# Patient Record
Sex: FEMALE | NOT HISPANIC OR LATINO | URBAN - METROPOLITAN AREA
[De-identification: names, ages, dates, MRNs, and addresses within clinical notes are randomized per-mention and may not be internally consistent; named-entity substitution may affect disease eponyms.]

---

## 2020-11-20 ENCOUNTER — APPOINTMENT (OUTPATIENT)
Age: 65
Setting detail: DERMATOLOGY
End: 2020-11-23

## 2020-11-20 DIAGNOSIS — L24 IRRITANT CONTACT DERMATITIS: ICD-10-CM

## 2020-11-20 DIAGNOSIS — L81.0 POSTINFLAMMATORY HYPERPIGMENTATION: ICD-10-CM

## 2020-11-20 PROBLEM — L24.9 IRRITANT CONTACT DERMATITIS, UNSPECIFIED CAUSE: Status: ACTIVE | Noted: 2020-11-20

## 2020-11-20 PROCEDURE — OTHER COUNSELING: OTHER

## 2020-11-20 PROCEDURE — OTHER TREATMENT REGIMEN: OTHER

## 2020-11-20 PROCEDURE — 99202 OFFICE O/P NEW SF 15 MIN: CPT

## 2020-11-20 PROCEDURE — OTHER MIPS QUALITY: OTHER

## 2020-11-20 PROCEDURE — OTHER PRESCRIPTION: OTHER

## 2020-11-20 RX ORDER — BETAMETHASONE VALERATE 1 MG/G
OINTMENT TOPICAL
Qty: 1 | Refills: 1 | Status: ERX | COMMUNITY
Start: 2020-11-20

## 2020-11-20 ASSESSMENT — LOCATION DETAILED DESCRIPTION DERM
LOCATION DETAILED: RIGHT MEDIAL TRAPEZIAL NECK
LOCATION DETAILED: LEFT INFERIOR ANTERIOR NECK
LOCATION DETAILED: RIGHT INFERIOR CENTRAL MALAR CHEEK
LOCATION DETAILED: RIGHT CENTRAL MALAR CHEEK
LOCATION DETAILED: RIGHT INFERIOR ANTERIOR NECK

## 2020-11-20 ASSESSMENT — LOCATION SIMPLE DESCRIPTION DERM
LOCATION SIMPLE: RIGHT CHEEK
LOCATION SIMPLE: POSTERIOR NECK
LOCATION SIMPLE: RIGHT ANTERIOR NECK
LOCATION SIMPLE: LEFT ANTERIOR NECK

## 2020-11-20 ASSESSMENT — LOCATION ZONE DERM
LOCATION ZONE: NECK
LOCATION ZONE: FACE

## 2020-11-20 ASSESSMENT — SEVERITY ASSESSMENT 2020: SEVERITY 2020: MILD

## 2020-11-20 NOTE — HPI: RASH
How Severe Is Your Rash?: moderate
Is This A New Presentation, Or A Follow-Up?: Rash
Additional History: Patient states the rash started about 10 days ago. It started as a small sore around the mouth that spread to her cheek and neck area. She was given mupirocin ointment as well as an antibiotic shot by the urgent care. They took a culture (on file) and told her to stop all other medication and start a medrol dose pack (prednisone).

## 2020-11-20 NOTE — PROCEDURE: TREATMENT REGIMEN
Otc Regimen: Zyrtec or Allegra during the day \\nBenadryl at night
Samples Given: VaniCream facial cleanser and moisturizer
Detail Level: Simple
Plan: betamethasone valerate .1 cream bid for 5 days prn.
Discontinue Regimen: All harsh products and washing face with a wash cloth (use fingertips)

## 2020-12-04 ENCOUNTER — APPOINTMENT (OUTPATIENT)
Age: 65
Setting detail: DERMATOLOGY
End: 2020-12-08

## 2020-12-04 DIAGNOSIS — L29.8 OTHER PRURITUS: ICD-10-CM

## 2020-12-04 DIAGNOSIS — L24 IRRITANT CONTACT DERMATITIS: ICD-10-CM

## 2020-12-04 DIAGNOSIS — L81.0 POSTINFLAMMATORY HYPERPIGMENTATION: ICD-10-CM

## 2020-12-04 PROBLEM — L24.9 IRRITANT CONTACT DERMATITIS, UNSPECIFIED CAUSE: Status: ACTIVE | Noted: 2020-12-04

## 2020-12-04 PROCEDURE — OTHER PRESCRIPTION: OTHER

## 2020-12-04 PROCEDURE — OTHER TREATMENT REGIMEN: OTHER

## 2020-12-04 PROCEDURE — 99213 OFFICE O/P EST LOW 20 MIN: CPT

## 2020-12-04 PROCEDURE — OTHER COUNSELING: OTHER

## 2020-12-04 RX ORDER — PREDNISONE 20 MG/1
TABLET ORAL
Qty: 10 | Refills: 0 | Status: ERX | COMMUNITY
Start: 2020-12-04

## 2020-12-04 RX ORDER — BETAMETHASONE DIPROPIONATE 0.5 MG/G
CREAM TOPICAL
Qty: 1 | Refills: 1 | Status: ERX | COMMUNITY
Start: 2020-12-04

## 2020-12-04 ASSESSMENT — LOCATION DETAILED DESCRIPTION DERM
LOCATION DETAILED: RIGHT CENTRAL MALAR CHEEK
LOCATION DETAILED: RIGHT INFERIOR CENTRAL MALAR CHEEK
LOCATION DETAILED: RIGHT INFERIOR ANTERIOR NECK
LOCATION DETAILED: RIGHT CENTRAL BUCCAL CHEEK
LOCATION DETAILED: LEFT INFERIOR ANTERIOR NECK
LOCATION DETAILED: RIGHT MEDIAL TRAPEZIAL NECK

## 2020-12-04 ASSESSMENT — LOCATION SIMPLE DESCRIPTION DERM
LOCATION SIMPLE: RIGHT ANTERIOR NECK
LOCATION SIMPLE: LEFT ANTERIOR NECK
LOCATION SIMPLE: RIGHT CHEEK
LOCATION SIMPLE: POSTERIOR NECK

## 2020-12-04 ASSESSMENT — LOCATION ZONE DERM
LOCATION ZONE: NECK
LOCATION ZONE: FACE

## 2020-12-04 ASSESSMENT — SEVERITY ASSESSMENT 2020: SEVERITY 2020: MODERATE

## 2020-12-04 NOTE — PROCEDURE: TREATMENT REGIMEN
Plan: Patient given contact information to [facility] to discuss fu evaluation and treatment (possible patch testing). Plan: Patient given contact information to [facility] to discuss fu evaluation and treatment (possible patch testing).

## 2020-12-04 NOTE — PROCEDURE: TREATMENT REGIMEN
Initiate Treatment: prednisone 20 mg tablet \\nSig: Take 1 pill PO twice daily x 5 days.\\n\\nbetamethasone dipropionate 0.05 % topical cream \\nSig: Apply to affected area (neck) twice a day for two weeks.

## 2020-12-04 NOTE — PROCEDURE: TREATMENT REGIMEN
Continue Regimen: Antihistamines on a scheduled daily basis for itching (Zyrtec Qam and Benadryl QHS)

## 2020-12-04 NOTE — PROCEDURE: TREATMENT REGIMEN
Discontinue Regimen: All harsh products and washing face with a wash cloth (use fingertips)\\nBetamethasone Valerate